# Patient Record
Sex: FEMALE | Race: BLACK OR AFRICAN AMERICAN | NOT HISPANIC OR LATINO
[De-identification: names, ages, dates, MRNs, and addresses within clinical notes are randomized per-mention and may not be internally consistent; named-entity substitution may affect disease eponyms.]

---

## 2017-01-10 ENCOUNTER — APPOINTMENT (OUTPATIENT)
Dept: CARDIOLOGY | Facility: CLINIC | Age: 37
End: 2017-01-10

## 2017-01-10 ENCOUNTER — NON-APPOINTMENT (OUTPATIENT)
Age: 37
End: 2017-01-10

## 2017-01-10 VITALS
OXYGEN SATURATION: 100 % | DIASTOLIC BLOOD PRESSURE: 70 MMHG | BODY MASS INDEX: 25.27 KG/M2 | HEART RATE: 78 BPM | HEIGHT: 67 IN | SYSTOLIC BLOOD PRESSURE: 110 MMHG | WEIGHT: 161 LBS

## 2017-02-08 ENCOUNTER — APPOINTMENT (OUTPATIENT)
Dept: RHEUMATOLOGY | Facility: CLINIC | Age: 37
End: 2017-02-08

## 2017-02-08 ENCOUNTER — APPOINTMENT (OUTPATIENT)
Dept: ENDOCRINOLOGY | Facility: CLINIC | Age: 37
End: 2017-02-08

## 2017-02-08 VITALS
HEIGHT: 67 IN | WEIGHT: 163 LBS | BODY MASS INDEX: 25.58 KG/M2 | SYSTOLIC BLOOD PRESSURE: 120 MMHG | DIASTOLIC BLOOD PRESSURE: 80 MMHG | OXYGEN SATURATION: 99 % | HEART RATE: 86 BPM

## 2017-02-08 VITALS
TEMPERATURE: 97.9 F | BODY MASS INDEX: 25.43 KG/M2 | SYSTOLIC BLOOD PRESSURE: 121 MMHG | HEIGHT: 67 IN | DIASTOLIC BLOOD PRESSURE: 79 MMHG | OXYGEN SATURATION: 98 % | WEIGHT: 162 LBS | HEART RATE: 83 BPM

## 2017-02-08 LAB
GLUCOSE BLDC GLUCOMTR-MCNC: 141
HBA1C MFR BLD HPLC: 6.2

## 2017-02-08 RX ORDER — INSULIN GLARGINE 100 [IU]/ML
100 INJECTION, SOLUTION SUBCUTANEOUS
Refills: 0 | Status: ACTIVE | COMMUNITY

## 2017-02-08 RX ORDER — LABETALOL HYDROCHLORIDE 200 MG/1
200 TABLET, FILM COATED ORAL DAILY
Qty: 90 | Refills: 2 | Status: DISCONTINUED | COMMUNITY
Start: 2017-01-10 | End: 2017-02-08

## 2017-02-09 LAB
25(OH)D3 SERPL-MCNC: 29.1 NG/ML
ALBUMIN SERPL ELPH-MCNC: 3.8 G/DL
ALP BLD-CCNC: 51 U/L
ALT SERPL-CCNC: 12 U/L
ANION GAP SERPL CALC-SCNC: 12 MMOL/L
AST SERPL-CCNC: 16 U/L
BASOPHILS # BLD AUTO: 0.02 K/UL
BASOPHILS NFR BLD AUTO: 0.5 %
BILIRUB SERPL-MCNC: 0.3 MG/DL
BUN SERPL-MCNC: 11 MG/DL
CALCIUM SERPL-MCNC: 9 MG/DL
CHLORIDE SERPL-SCNC: 103 MMOL/L
CO2 SERPL-SCNC: 25 MMOL/L
CREAT SERPL-MCNC: 0.53 MG/DL
CRP SERPL-MCNC: <0.2 MG/DL
EOSINOPHIL # BLD AUTO: 0.12 K/UL
EOSINOPHIL NFR BLD AUTO: 2.7 %
ERYTHROCYTE [SEDIMENTATION RATE] IN BLOOD BY WESTERGREN METHOD: 2 MM/HR
GLUCOSE SERPL-MCNC: 65 MG/DL
HCT VFR BLD CALC: 35.5 %
HGB BLD-MCNC: 11.6 G/DL
IMM GRANULOCYTES NFR BLD AUTO: 0 %
LYMPHOCYTES # BLD AUTO: 2.18 K/UL
LYMPHOCYTES NFR BLD AUTO: 49.3 %
MAN DIFF?: NORMAL
MCHC RBC-ENTMCNC: 29.7 PG
MCHC RBC-ENTMCNC: 32.7 GM/DL
MCV RBC AUTO: 91 FL
MONOCYTES # BLD AUTO: 0.49 K/UL
MONOCYTES NFR BLD AUTO: 11.1 %
NEUTROPHILS # BLD AUTO: 1.61 K/UL
NEUTROPHILS NFR BLD AUTO: 36.4 %
PLATELET # BLD AUTO: 236 K/UL
POTASSIUM SERPL-SCNC: 3.9 MMOL/L
PROT SERPL-MCNC: 6.5 G/DL
RBC # BLD: 3.9 M/UL
RBC # FLD: 13.9 %
SODIUM SERPL-SCNC: 140 MMOL/L
URATE SERPL-MCNC: 4.4 MG/DL
WBC # FLD AUTO: 4.42 K/UL

## 2017-02-13 ENCOUNTER — OTHER (OUTPATIENT)
Age: 37
End: 2017-02-13

## 2017-02-14 ENCOUNTER — APPOINTMENT (OUTPATIENT)
Dept: RHEUMATOLOGY | Facility: CLINIC | Age: 37
End: 2017-02-14

## 2017-02-14 VITALS
HEIGHT: 67 IN | BODY MASS INDEX: 25.43 KG/M2 | SYSTOLIC BLOOD PRESSURE: 122 MMHG | WEIGHT: 162 LBS | TEMPERATURE: 98.5 F | DIASTOLIC BLOOD PRESSURE: 81 MMHG | HEART RATE: 105 BPM | OXYGEN SATURATION: 99 %

## 2017-02-14 RX ORDER — LIDOCAINE HYDROCHLORIDE 10 MG/ML
1 INJECTION, SOLUTION INFILTRATION
Refills: 0 | Status: COMPLETED | OUTPATIENT
Start: 2017-02-14

## 2017-02-14 RX ADMIN — LIDOCAINE HYDROCHLORIDE 0 %: 10 INJECTION, SOLUTION EPIDURAL; INFILTRATION; INTRACAUDAL; PERINEURAL at 00:00

## 2017-02-27 ENCOUNTER — RX RENEWAL (OUTPATIENT)
Age: 37
End: 2017-02-27

## 2017-03-20 ENCOUNTER — RX RENEWAL (OUTPATIENT)
Age: 37
End: 2017-03-20

## 2017-03-30 ENCOUNTER — APPOINTMENT (OUTPATIENT)
Dept: RHEUMATOLOGY | Facility: CLINIC | Age: 37
End: 2017-03-30

## 2017-03-31 VITALS
TEMPERATURE: 98.1 F | HEART RATE: 96 BPM | SYSTOLIC BLOOD PRESSURE: 119 MMHG | RESPIRATION RATE: 18 BRPM | DIASTOLIC BLOOD PRESSURE: 79 MMHG

## 2017-05-22 ENCOUNTER — APPOINTMENT (OUTPATIENT)
Dept: RHEUMATOLOGY | Facility: CLINIC | Age: 37
End: 2017-05-22

## 2017-05-31 ENCOUNTER — APPOINTMENT (OUTPATIENT)
Dept: RHEUMATOLOGY | Facility: CLINIC | Age: 37
End: 2017-05-31

## 2017-05-31 VITALS
HEIGHT: 67 IN | BODY MASS INDEX: 24.01 KG/M2 | DIASTOLIC BLOOD PRESSURE: 80 MMHG | OXYGEN SATURATION: 98 % | WEIGHT: 153 LBS | HEART RATE: 89 BPM | TEMPERATURE: 98.9 F | SYSTOLIC BLOOD PRESSURE: 118 MMHG

## 2017-05-31 RX ORDER — ETANERCEPT 50 MG/ML
50 SOLUTION SUBCUTANEOUS
Qty: 3 | Refills: 3 | Status: DISCONTINUED | COMMUNITY
Start: 2017-03-31 | End: 2017-05-31

## 2017-06-01 ENCOUNTER — OTHER (OUTPATIENT)
Age: 37
End: 2017-06-01

## 2017-06-01 LAB
25(OH)D3 SERPL-MCNC: 26.9 NG/ML
ALBUMIN SERPL ELPH-MCNC: 4.3 G/DL
ALP BLD-CCNC: 57 U/L
ALT SERPL-CCNC: 11 U/L
ANION GAP SERPL CALC-SCNC: 15 MMOL/L
AST SERPL-CCNC: 19 U/L
BASOPHILS # BLD AUTO: 0.02 K/UL
BASOPHILS NFR BLD AUTO: 0.4 %
BILIRUB SERPL-MCNC: 0.3 MG/DL
BUN SERPL-MCNC: 11 MG/DL
CALCIUM SERPL-MCNC: 9 MG/DL
CHLORIDE SERPL-SCNC: 101 MMOL/L
CO2 SERPL-SCNC: 23 MMOL/L
CREAT SERPL-MCNC: 0.66 MG/DL
CRP SERPL-MCNC: <0.2 MG/DL
EOSINOPHIL # BLD AUTO: 0.06 K/UL
EOSINOPHIL NFR BLD AUTO: 1.2 %
ERYTHROCYTE [SEDIMENTATION RATE] IN BLOOD BY WESTERGREN METHOD: 2 MM/HR
GLUCOSE SERPL-MCNC: 135 MG/DL
HCT VFR BLD CALC: 34.4 %
HGB BLD-MCNC: 11.2 G/DL
IMM GRANULOCYTES NFR BLD AUTO: 0.2 %
LYMPHOCYTES # BLD AUTO: 1.89 K/UL
LYMPHOCYTES NFR BLD AUTO: 37.1 %
MAN DIFF?: NORMAL
MCHC RBC-ENTMCNC: 28.5 PG
MCHC RBC-ENTMCNC: 32.6 GM/DL
MCV RBC AUTO: 87.5 FL
MONOCYTES # BLD AUTO: 0.41 K/UL
MONOCYTES NFR BLD AUTO: 8.1 %
NEUTROPHILS # BLD AUTO: 2.7 K/UL
NEUTROPHILS NFR BLD AUTO: 53 %
PLATELET # BLD AUTO: 281 K/UL
POTASSIUM SERPL-SCNC: 4.1 MMOL/L
PROT SERPL-MCNC: 7.2 G/DL
RBC # BLD: 3.93 M/UL
RBC # FLD: 12.9 %
SODIUM SERPL-SCNC: 139 MMOL/L
WBC # FLD AUTO: 5.09 K/UL

## 2017-06-01 RX ORDER — PREDNISONE 10 MG/1
10 TABLET ORAL
Qty: 30 | Refills: 0 | Status: DISCONTINUED | COMMUNITY
Start: 2017-02-08 | End: 2017-06-01

## 2017-06-14 ENCOUNTER — APPOINTMENT (OUTPATIENT)
Dept: ENDOCRINOLOGY | Facility: CLINIC | Age: 37
End: 2017-06-14

## 2017-06-20 ENCOUNTER — OTHER (OUTPATIENT)
Age: 37
End: 2017-06-20

## 2017-07-12 ENCOUNTER — FORM ENCOUNTER (OUTPATIENT)
Age: 37
End: 2017-07-12

## 2017-07-13 ENCOUNTER — APPOINTMENT (OUTPATIENT)
Dept: RHEUMATOLOGY | Facility: CLINIC | Age: 37
End: 2017-07-13

## 2017-07-13 VITALS — SYSTOLIC BLOOD PRESSURE: 130 MMHG | OXYGEN SATURATION: 99 % | HEART RATE: 86 BPM | DIASTOLIC BLOOD PRESSURE: 86 MMHG

## 2017-07-13 DIAGNOSIS — I31.3 PERICARDIAL EFFUSION (NONINFLAMMATORY): ICD-10-CM

## 2017-07-13 DIAGNOSIS — I10 ESSENTIAL (PRIMARY) HYPERTENSION: ICD-10-CM

## 2017-07-14 ENCOUNTER — OTHER (OUTPATIENT)
Age: 37
End: 2017-07-14

## 2017-07-14 LAB
25(OH)D3 SERPL-MCNC: 18.3 NG/ML
ALBUMIN SERPL ELPH-MCNC: 4.1 G/DL
ALP BLD-CCNC: 56 U/L
ALT SERPL-CCNC: 13 U/L
ANION GAP SERPL CALC-SCNC: 19 MMOL/L
AST SERPL-CCNC: 11 U/L
BASOPHILS # BLD AUTO: 0.01 K/UL
BASOPHILS NFR BLD AUTO: 0.2 %
BILIRUB SERPL-MCNC: 0.2 MG/DL
BUN SERPL-MCNC: 7 MG/DL
CALCIUM SERPL-MCNC: 8.9 MG/DL
CHLORIDE SERPL-SCNC: 100 MMOL/L
CO2 SERPL-SCNC: 19 MMOL/L
CREAT SERPL-MCNC: 0.61 MG/DL
CRP SERPL-MCNC: <0.2 MG/DL
EOSINOPHIL # BLD AUTO: 0.02 K/UL
EOSINOPHIL NFR BLD AUTO: 0.4 %
ERYTHROCYTE [SEDIMENTATION RATE] IN BLOOD BY WESTERGREN METHOD: 2 MM/HR
GLUCOSE SERPL-MCNC: 264 MG/DL
HCT VFR BLD CALC: 34.2 %
HGB BLD-MCNC: 11.2 G/DL
IMM GRANULOCYTES NFR BLD AUTO: 0 %
LYMPHOCYTES # BLD AUTO: 1.29 K/UL
LYMPHOCYTES NFR BLD AUTO: 23.3 %
MAN DIFF?: NORMAL
MCHC RBC-ENTMCNC: 28.1 PG
MCHC RBC-ENTMCNC: 32.7 GM/DL
MCV RBC AUTO: 85.9 FL
MONOCYTES # BLD AUTO: 0.35 K/UL
MONOCYTES NFR BLD AUTO: 6.3 %
NEUTROPHILS # BLD AUTO: 3.86 K/UL
NEUTROPHILS NFR BLD AUTO: 69.8 %
PLATELET # BLD AUTO: 276 K/UL
POTASSIUM SERPL-SCNC: 4.3 MMOL/L
PROT SERPL-MCNC: 7 G/DL
RBC # BLD: 3.98 M/UL
RBC # FLD: 14.2 %
SODIUM SERPL-SCNC: 138 MMOL/L
TSH SERPL-ACNC: 1.17 UIU/ML
WBC # FLD AUTO: 5.53 K/UL

## 2017-07-25 ENCOUNTER — FORM ENCOUNTER (OUTPATIENT)
Age: 37
End: 2017-07-25

## 2017-07-26 ENCOUNTER — OUTPATIENT (OUTPATIENT)
Dept: OUTPATIENT SERVICES | Facility: HOSPITAL | Age: 37
LOS: 1 days | End: 2017-07-26
Payer: COMMERCIAL

## 2017-07-26 ENCOUNTER — APPOINTMENT (OUTPATIENT)
Dept: MRI IMAGING | Facility: CLINIC | Age: 37
End: 2017-07-26

## 2017-07-26 DIAGNOSIS — M25.462 EFFUSION, LEFT KNEE: ICD-10-CM

## 2017-07-26 DIAGNOSIS — E78.89 OTHER LIPOPROTEIN METABOLISM DISORDERS: ICD-10-CM

## 2017-07-26 DIAGNOSIS — M25.461 EFFUSION, RIGHT KNEE: ICD-10-CM

## 2017-07-26 PROCEDURE — 73721 MRI JNT OF LWR EXTRE W/O DYE: CPT

## 2017-07-27 ENCOUNTER — RX RENEWAL (OUTPATIENT)
Age: 37
End: 2017-07-27

## 2017-08-02 ENCOUNTER — OTHER (OUTPATIENT)
Age: 37
End: 2017-08-02

## 2017-08-02 RX ORDER — PREDNISONE 5 MG/1
5 TABLET ORAL
Qty: 90 | Refills: 0 | Status: DISCONTINUED | COMMUNITY
Start: 2017-05-31 | End: 2017-08-02

## 2017-08-15 ENCOUNTER — LABORATORY RESULT (OUTPATIENT)
Age: 37
End: 2017-08-15

## 2017-08-15 ENCOUNTER — APPOINTMENT (OUTPATIENT)
Dept: ORTHOPEDIC SURGERY | Facility: CLINIC | Age: 37
End: 2017-08-15
Payer: COMMERCIAL

## 2017-08-15 VITALS
DIASTOLIC BLOOD PRESSURE: 92 MMHG | HEIGHT: 67 IN | SYSTOLIC BLOOD PRESSURE: 136 MMHG | BODY MASS INDEX: 24.01 KG/M2 | WEIGHT: 153 LBS | HEART RATE: 98 BPM

## 2017-08-15 DIAGNOSIS — Z78.9 OTHER SPECIFIED HEALTH STATUS: ICD-10-CM

## 2017-08-15 DIAGNOSIS — K65.9 PERITONITIS, UNSPECIFIED: ICD-10-CM

## 2017-08-15 DIAGNOSIS — M12.261 VILLONODULAR SYNOVITIS (PIGMENTED), RIGHT KNEE: ICD-10-CM

## 2017-08-15 PROCEDURE — 20610 DRAIN/INJ JOINT/BURSA W/O US: CPT | Mod: RT

## 2017-08-15 PROCEDURE — 99203 OFFICE O/P NEW LOW 30 MIN: CPT | Mod: 25

## 2017-08-16 LAB — CRYSTALS SNV QL MICRO: NORMAL

## 2017-08-21 LAB
BACTERIA FLD CULT: NORMAL
BACTERIA FLD CULT: NORMAL

## 2017-09-07 ENCOUNTER — RX RENEWAL (OUTPATIENT)
Age: 37
End: 2017-09-07

## 2017-10-16 ENCOUNTER — RX RENEWAL (OUTPATIENT)
Age: 37
End: 2017-10-16

## 2017-10-25 ENCOUNTER — RX RENEWAL (OUTPATIENT)
Age: 37
End: 2017-10-25

## 2017-10-26 ENCOUNTER — RX RENEWAL (OUTPATIENT)
Age: 37
End: 2017-10-26

## 2017-11-21 ENCOUNTER — RX RENEWAL (OUTPATIENT)
Age: 37
End: 2017-11-21

## 2017-11-29 ENCOUNTER — APPOINTMENT (OUTPATIENT)
Dept: ENDOCRINOLOGY | Facility: CLINIC | Age: 37
End: 2017-11-29

## 2017-12-06 ENCOUNTER — RX RENEWAL (OUTPATIENT)
Age: 37
End: 2017-12-06

## 2017-12-06 ENCOUNTER — TRANSCRIPTION ENCOUNTER (OUTPATIENT)
Age: 37
End: 2017-12-06

## 2018-01-02 ENCOUNTER — RX RENEWAL (OUTPATIENT)
Age: 38
End: 2018-01-02

## 2018-01-17 ENCOUNTER — APPOINTMENT (OUTPATIENT)
Dept: ENDOCRINOLOGY | Facility: CLINIC | Age: 38
End: 2018-01-17
Payer: COMMERCIAL

## 2018-01-17 PROCEDURE — 83036 HEMOGLOBIN GLYCOSYLATED A1C: CPT | Mod: QW

## 2018-01-17 PROCEDURE — 82962 GLUCOSE BLOOD TEST: CPT

## 2018-01-17 PROCEDURE — G0108 DIAB MANAGE TRN  PER INDIV: CPT

## 2018-01-18 LAB
GLUCOSE BLDC GLUCOMTR-MCNC: 315
HBA1C MFR BLD HPLC: 12.2

## 2018-01-18 RX ORDER — FLASH GLUCOSE SENSOR
KIT MISCELLANEOUS
Qty: 1 | Refills: 0 | Status: ACTIVE | COMMUNITY
Start: 2018-01-17

## 2018-01-31 ENCOUNTER — APPOINTMENT (OUTPATIENT)
Dept: ENDOCRINOLOGY | Facility: CLINIC | Age: 38
End: 2018-01-31
Payer: COMMERCIAL

## 2018-01-31 PROCEDURE — G0108 DIAB MANAGE TRN  PER INDIV: CPT

## 2018-02-03 ENCOUNTER — RX RENEWAL (OUTPATIENT)
Age: 38
End: 2018-02-03

## 2018-02-07 ENCOUNTER — RX RENEWAL (OUTPATIENT)
Age: 38
End: 2018-02-07

## 2018-02-12 ENCOUNTER — TRANSCRIPTION ENCOUNTER (OUTPATIENT)
Age: 38
End: 2018-02-12

## 2018-02-26 ENCOUNTER — RX RENEWAL (OUTPATIENT)
Age: 38
End: 2018-02-26

## 2018-03-23 ENCOUNTER — APPOINTMENT (OUTPATIENT)
Dept: RHEUMATOLOGY | Facility: CLINIC | Age: 38
End: 2018-03-23
Payer: COMMERCIAL

## 2018-03-23 ENCOUNTER — OTHER (OUTPATIENT)
Age: 38
End: 2018-03-23

## 2018-03-23 VITALS
SYSTOLIC BLOOD PRESSURE: 115 MMHG | HEART RATE: 80 BPM | HEIGHT: 67 IN | BODY MASS INDEX: 24.8 KG/M2 | TEMPERATURE: 98.5 F | DIASTOLIC BLOOD PRESSURE: 81 MMHG | WEIGHT: 158 LBS | OXYGEN SATURATION: 98 %

## 2018-03-23 DIAGNOSIS — M25.569 PAIN IN UNSPECIFIED KNEE: ICD-10-CM

## 2018-03-23 PROCEDURE — 99213 OFFICE O/P EST LOW 20 MIN: CPT

## 2018-03-26 ENCOUNTER — TRANSCRIPTION ENCOUNTER (OUTPATIENT)
Age: 38
End: 2018-03-26

## 2018-03-26 LAB
ALBUMIN SERPL ELPH-MCNC: 4.1 G/DL
ALP BLD-CCNC: 58 U/L
ALT SERPL-CCNC: 8 U/L
ANION GAP SERPL CALC-SCNC: 14 MMOL/L
AST SERPL-CCNC: 16 U/L
BASOPHILS # BLD AUTO: 0.02 K/UL
BASOPHILS NFR BLD AUTO: 0.4 %
BILIRUB SERPL-MCNC: 0.3 MG/DL
BUN SERPL-MCNC: 9 MG/DL
CALCIUM SERPL-MCNC: 9.1 MG/DL
CHLORIDE SERPL-SCNC: 102 MMOL/L
CO2 SERPL-SCNC: 23 MMOL/L
CREAT SERPL-MCNC: 0.6 MG/DL
CRP SERPL-MCNC: <0.2 MG/DL
EOSINOPHIL # BLD AUTO: 0.09 K/UL
EOSINOPHIL NFR BLD AUTO: 2 %
GLUCOSE SERPL-MCNC: 124 MG/DL
HCT VFR BLD CALC: 32.7 %
HGB BLD-MCNC: 10.5 G/DL
IMM GRANULOCYTES NFR BLD AUTO: 0 %
LYMPHOCYTES # BLD AUTO: 2.05 K/UL
LYMPHOCYTES NFR BLD AUTO: 46.1 %
MAN DIFF?: NORMAL
MCHC RBC-ENTMCNC: 26.4 PG
MCHC RBC-ENTMCNC: 32.1 GM/DL
MCV RBC AUTO: 82.2 FL
MONOCYTES # BLD AUTO: 0.41 K/UL
MONOCYTES NFR BLD AUTO: 9.2 %
NEUTROPHILS # BLD AUTO: 1.88 K/UL
NEUTROPHILS NFR BLD AUTO: 42.3 %
PLATELET # BLD AUTO: 244 K/UL
POTASSIUM SERPL-SCNC: 4 MMOL/L
PROT SERPL-MCNC: 7.2 G/DL
RBC # BLD: 3.98 M/UL
RBC # FLD: 15.5 %
SODIUM SERPL-SCNC: 139 MMOL/L
WBC # FLD AUTO: 4.45 K/UL

## 2018-03-29 ENCOUNTER — TRANSCRIPTION ENCOUNTER (OUTPATIENT)
Age: 38
End: 2018-03-29

## 2018-04-18 ENCOUNTER — APPOINTMENT (OUTPATIENT)
Dept: ENDOCRINOLOGY | Facility: CLINIC | Age: 38
End: 2018-04-18
Payer: COMMERCIAL

## 2018-04-18 VITALS
WEIGHT: 159 LBS | OXYGEN SATURATION: 99 % | DIASTOLIC BLOOD PRESSURE: 82 MMHG | HEART RATE: 100 BPM | SYSTOLIC BLOOD PRESSURE: 116 MMHG | BODY MASS INDEX: 24.96 KG/M2 | HEIGHT: 67 IN

## 2018-04-18 LAB
GLUCOSE BLDC GLUCOMTR-MCNC: 91
HBA1C MFR BLD HPLC: 6.7

## 2018-04-18 PROCEDURE — 83036 HEMOGLOBIN GLYCOSYLATED A1C: CPT | Mod: QW

## 2018-04-18 PROCEDURE — 82962 GLUCOSE BLOOD TEST: CPT

## 2018-04-18 PROCEDURE — 99215 OFFICE O/P EST HI 40 MIN: CPT | Mod: 25

## 2018-05-23 ENCOUNTER — APPOINTMENT (OUTPATIENT)
Dept: RHEUMATOLOGY | Facility: CLINIC | Age: 38
End: 2018-05-23
Payer: COMMERCIAL

## 2018-05-23 VITALS
HEIGHT: 67 IN | TEMPERATURE: 99 F | BODY MASS INDEX: 24.8 KG/M2 | WEIGHT: 158 LBS | SYSTOLIC BLOOD PRESSURE: 114 MMHG | HEART RATE: 85 BPM | OXYGEN SATURATION: 99 % | DIASTOLIC BLOOD PRESSURE: 75 MMHG

## 2018-05-23 DIAGNOSIS — D64.9 ANEMIA, UNSPECIFIED: ICD-10-CM

## 2018-05-23 LAB
ALBUMIN SERPL ELPH-MCNC: 4.3 G/DL
ALP BLD-CCNC: 68 U/L
ALT SERPL-CCNC: 13 U/L
ANION GAP SERPL CALC-SCNC: 12 MMOL/L
AST SERPL-CCNC: 16 U/L
BASOPHILS # BLD AUTO: 0.01 K/UL
BASOPHILS NFR BLD AUTO: 0.2 %
BILIRUB SERPL-MCNC: 0.3 MG/DL
BUN SERPL-MCNC: 11 MG/DL
CALCIUM SERPL-MCNC: 9.2 MG/DL
CHLORIDE SERPL-SCNC: 105 MMOL/L
CO2 SERPL-SCNC: 23 MMOL/L
CREAT SERPL-MCNC: 0.62 MG/DL
CRP SERPL-MCNC: 0.2 MG/DL
EOSINOPHIL # BLD AUTO: 0.06 K/UL
EOSINOPHIL NFR BLD AUTO: 1.4 %
GLUCOSE SERPL-MCNC: 99 MG/DL
HCT VFR BLD CALC: 33.2 %
HGB BLD-MCNC: 10.8 G/DL
IMM GRANULOCYTES NFR BLD AUTO: 0 %
IRON SATN MFR SERPL: 9 %
IRON SERPL-MCNC: 32 UG/DL
LYMPHOCYTES # BLD AUTO: 1.6 K/UL
LYMPHOCYTES NFR BLD AUTO: 38.6 %
MAN DIFF?: NORMAL
MCHC RBC-ENTMCNC: 26.9 PG
MCHC RBC-ENTMCNC: 32.5 GM/DL
MCV RBC AUTO: 82.8 FL
MONOCYTES # BLD AUTO: 0.47 K/UL
MONOCYTES NFR BLD AUTO: 11.4 %
NEUTROPHILS # BLD AUTO: 2 K/UL
NEUTROPHILS NFR BLD AUTO: 48.4 %
PLATELET # BLD AUTO: 253 K/UL
POTASSIUM SERPL-SCNC: 4.1 MMOL/L
PROT SERPL-MCNC: 7.2 G/DL
RBC # BLD: 4.01 M/UL
RBC # FLD: 16 %
SODIUM SERPL-SCNC: 140 MMOL/L
TIBC SERPL-MCNC: 342 UG/DL
UIBC SERPL-MCNC: 310 UG/DL
WBC # FLD AUTO: 4.14 K/UL

## 2018-05-23 PROCEDURE — 99214 OFFICE O/P EST MOD 30 MIN: CPT

## 2018-05-25 LAB
25(OH)D3 SERPL-MCNC: 35.2 NG/ML
FERRITIN SERPL-MCNC: 17 NG/ML
TSH SERPL-ACNC: 2.15 UIU/ML

## 2018-06-13 ENCOUNTER — APPOINTMENT (OUTPATIENT)
Dept: ENDOCRINOLOGY | Facility: CLINIC | Age: 38
End: 2018-06-13

## 2018-06-25 ENCOUNTER — RX RENEWAL (OUTPATIENT)
Age: 38
End: 2018-06-25

## 2018-06-26 ENCOUNTER — RX RENEWAL (OUTPATIENT)
Age: 38
End: 2018-06-26

## 2018-08-16 ENCOUNTER — APPOINTMENT (OUTPATIENT)
Dept: RHEUMATOLOGY | Facility: CLINIC | Age: 38
End: 2018-08-16
Payer: COMMERCIAL

## 2018-08-16 VITALS
HEART RATE: 81 BPM | TEMPERATURE: 98.7 F | DIASTOLIC BLOOD PRESSURE: 86 MMHG | OXYGEN SATURATION: 95 % | WEIGHT: 157 LBS | SYSTOLIC BLOOD PRESSURE: 132 MMHG | BODY MASS INDEX: 24.64 KG/M2 | HEIGHT: 67 IN

## 2018-08-16 DIAGNOSIS — M25.462 EFFUSION, LEFT KNEE: ICD-10-CM

## 2018-08-16 DIAGNOSIS — E78.89 OTHER LIPOPROTEIN METABOLISM DISORDERS: ICD-10-CM

## 2018-08-16 PROCEDURE — 99213 OFFICE O/P EST LOW 20 MIN: CPT

## 2018-09-27 ENCOUNTER — RX RENEWAL (OUTPATIENT)
Age: 38
End: 2018-09-27

## 2018-09-27 RX ORDER — FOLIC ACID 1 MG/1
1 TABLET ORAL DAILY
Qty: 100 | Refills: 0 | Status: ACTIVE | COMMUNITY
Start: 2017-05-31 | End: 1900-01-01

## 2018-10-11 ENCOUNTER — APPOINTMENT (OUTPATIENT)
Dept: RHEUMATOLOGY | Facility: CLINIC | Age: 38
End: 2018-10-11
Payer: COMMERCIAL

## 2018-10-11 ENCOUNTER — OTHER (OUTPATIENT)
Age: 38
End: 2018-10-11

## 2018-10-11 VITALS
SYSTOLIC BLOOD PRESSURE: 113 MMHG | TEMPERATURE: 99 F | WEIGHT: 156 LBS | HEART RATE: 85 BPM | OXYGEN SATURATION: 99 % | DIASTOLIC BLOOD PRESSURE: 76 MMHG | BODY MASS INDEX: 24.48 KG/M2 | HEIGHT: 67 IN

## 2018-10-11 DIAGNOSIS — M25.569 PAIN IN UNSPECIFIED KNEE: ICD-10-CM

## 2018-10-11 DIAGNOSIS — M25.461 EFFUSION, RIGHT KNEE: ICD-10-CM

## 2018-10-11 DIAGNOSIS — M25.462 EFFUSION, RIGHT KNEE: ICD-10-CM

## 2018-10-11 PROCEDURE — 99213 OFFICE O/P EST LOW 20 MIN: CPT

## 2018-10-11 RX ORDER — METHOTREXATE 2.5 MG/1
2.5 TABLET ORAL
Qty: 34 | Refills: 4 | Status: ACTIVE | COMMUNITY
Start: 2017-05-31 | End: 1900-01-01

## 2018-10-12 LAB
ALBUMIN SERPL ELPH-MCNC: 4.2 G/DL
ALP BLD-CCNC: 65 U/L
ALT SERPL-CCNC: 11 U/L
ANION GAP SERPL CALC-SCNC: 9 MMOL/L
AST SERPL-CCNC: 14 U/L
BASOPHILS # BLD AUTO: 0.02 K/UL
BASOPHILS NFR BLD AUTO: 0.4 %
BILIRUB SERPL-MCNC: 0.2 MG/DL
BUN SERPL-MCNC: 9 MG/DL
CALCIUM SERPL-MCNC: 8.7 MG/DL
CHLORIDE SERPL-SCNC: 103 MMOL/L
CO2 SERPL-SCNC: 25 MMOL/L
CREAT SERPL-MCNC: 0.59 MG/DL
CRP SERPL-MCNC: 0.4 MG/DL
EOSINOPHIL # BLD AUTO: 0.07 K/UL
EOSINOPHIL NFR BLD AUTO: 1.3 %
ERYTHROCYTE [SEDIMENTATION RATE] IN BLOOD BY WESTERGREN METHOD: 5 MM/HR
GLUCOSE SERPL-MCNC: 146 MG/DL
HCT VFR BLD CALC: 33.8 %
HGB BLD-MCNC: 11.3 G/DL
IMM GRANULOCYTES NFR BLD AUTO: 0 %
LYMPHOCYTES # BLD AUTO: 1.64 K/UL
LYMPHOCYTES NFR BLD AUTO: 31 %
MAN DIFF?: NORMAL
MCHC RBC-ENTMCNC: 28.5 PG
MCHC RBC-ENTMCNC: 33.4 GM/DL
MCV RBC AUTO: 85.4 FL
MONOCYTES # BLD AUTO: 0.63 K/UL
MONOCYTES NFR BLD AUTO: 11.9 %
NEUTROPHILS # BLD AUTO: 2.93 K/UL
NEUTROPHILS NFR BLD AUTO: 55.4 %
PLATELET # BLD AUTO: 270 K/UL
POTASSIUM SERPL-SCNC: 4 MMOL/L
PROT SERPL-MCNC: 6.7 G/DL
RBC # BLD: 3.96 M/UL
RBC # FLD: 15.8 %
SODIUM SERPL-SCNC: 137 MMOL/L
WBC # FLD AUTO: 5.29 K/UL

## 2018-12-03 ENCOUNTER — APPOINTMENT (OUTPATIENT)
Dept: RHEUMATOLOGY | Facility: CLINIC | Age: 38
End: 2018-12-03

## 2019-01-02 ENCOUNTER — APPOINTMENT (OUTPATIENT)
Dept: ENDOCRINOLOGY | Facility: CLINIC | Age: 39
End: 2019-01-02
Payer: COMMERCIAL

## 2019-01-02 VITALS
OXYGEN SATURATION: 98 % | HEART RATE: 74 BPM | HEIGHT: 67 IN | SYSTOLIC BLOOD PRESSURE: 118 MMHG | BODY MASS INDEX: 24.8 KG/M2 | WEIGHT: 158 LBS | DIASTOLIC BLOOD PRESSURE: 74 MMHG

## 2019-01-02 DIAGNOSIS — O24.919 UNSPECIFIED DIABETES MELLITUS IN PREGNANCY, UNSPECIFIED TRIMESTER: ICD-10-CM

## 2019-01-02 LAB
GLUCOSE BLDC GLUCOMTR-MCNC: 163
HBA1C MFR BLD HPLC: 6.7
T4 FREE SERPL-MCNC: 1.2 NG/DL
TSH SERPL-ACNC: 2.8 UIU/ML
VIT B12 SERPL-MCNC: 194 PG/ML

## 2019-01-02 PROCEDURE — 82962 GLUCOSE BLOOD TEST: CPT

## 2019-01-02 PROCEDURE — 99214 OFFICE O/P EST MOD 30 MIN: CPT | Mod: 25

## 2019-01-02 PROCEDURE — 95251 CONT GLUC MNTR ANALYSIS I&R: CPT

## 2019-01-02 PROCEDURE — 83036 HEMOGLOBIN GLYCOSYLATED A1C: CPT | Mod: QW

## 2019-01-02 RX ORDER — CHOLECALCIFEROL (VITAMIN D3) 1250 MCG
1.25 MG CAPSULE ORAL
Qty: 4 | Refills: 3 | Status: DISCONTINUED | COMMUNITY
Start: 2017-07-14 | End: 2019-01-02

## 2019-01-08 LAB
25(OH)D3 SERPL-MCNC: 33 NG/ML
ALBUMIN SERPL ELPH-MCNC: 4.1 G/DL
ALP BLD-CCNC: 59 U/L
ALT SERPL-CCNC: 11 U/L
ANION GAP SERPL CALC-SCNC: 9 MMOL/L
AST SERPL-CCNC: 16 U/L
BILIRUB SERPL-MCNC: 0.4 MG/DL
BUN SERPL-MCNC: 10 MG/DL
CALCIUM SERPL-MCNC: 9.1 MG/DL
CHLORIDE SERPL-SCNC: 104 MMOL/L
CHOLEST SERPL-MCNC: 135 MG/DL
CHOLEST/HDLC SERPL: 2.8 RATIO
CO2 SERPL-SCNC: 26 MMOL/L
CREAT SERPL-MCNC: 0.46 MG/DL
CREAT SPEC-SCNC: 182 MG/DL
GLUCOSE SERPL-MCNC: 154 MG/DL
HDLC SERPL-MCNC: 48 MG/DL
LDLC SERPL CALC-MCNC: 81 MG/DL
MICROALBUMIN 24H UR DL<=1MG/L-MCNC: <1.2 MG/DL
MICROALBUMIN/CREAT 24H UR-RTO: NORMAL
POTASSIUM SERPL-SCNC: 3.7 MMOL/L
PROT SERPL-MCNC: 7 G/DL
SODIUM SERPL-SCNC: 139 MMOL/L
TRIGL SERPL-MCNC: 32 MG/DL
TTG IGA SER IA-ACNC: <5 UNITS
TTG IGA SER-ACNC: NEGATIVE
TTG IGG SER IA-ACNC: 17.9 UNITS
TTG IGG SER IA-ACNC: NEGATIVE

## 2019-03-12 ENCOUNTER — TRANSCRIPTION ENCOUNTER (OUTPATIENT)
Age: 39
End: 2019-03-12

## 2019-03-13 ENCOUNTER — APPOINTMENT (OUTPATIENT)
Dept: OPHTHALMOLOGY | Facility: CLINIC | Age: 39
End: 2019-03-13
Payer: COMMERCIAL

## 2019-03-13 DIAGNOSIS — H40.003 PREGLAUCOMA, UNSPECIFIED, BILATERAL: ICD-10-CM

## 2019-03-13 PROCEDURE — 92004 COMPRE OPH EXAM NEW PT 1/>: CPT

## 2019-03-13 PROCEDURE — 92133 CPTRZD OPH DX IMG PST SGM ON: CPT

## 2019-03-26 ENCOUNTER — MEDICATION RENEWAL (OUTPATIENT)
Age: 39
End: 2019-03-26

## 2019-04-17 ENCOUNTER — APPOINTMENT (OUTPATIENT)
Dept: ENDOCRINOLOGY | Facility: CLINIC | Age: 39
End: 2019-04-17
Payer: COMMERCIAL

## 2019-04-17 VITALS
WEIGHT: 155 LBS | HEART RATE: 75 BPM | DIASTOLIC BLOOD PRESSURE: 82 MMHG | OXYGEN SATURATION: 98 % | HEIGHT: 67 IN | SYSTOLIC BLOOD PRESSURE: 122 MMHG | BODY MASS INDEX: 24.33 KG/M2

## 2019-04-17 LAB
GLUCOSE BLDC GLUCOMTR-MCNC: 184
HBA1C MFR BLD HPLC: 6.9

## 2019-04-17 PROCEDURE — 83036 HEMOGLOBIN GLYCOSYLATED A1C: CPT | Mod: GC,QW

## 2019-04-17 PROCEDURE — 82962 GLUCOSE BLOOD TEST: CPT | Mod: GC

## 2019-04-17 PROCEDURE — 99214 OFFICE O/P EST MOD 30 MIN: CPT | Mod: 25,GC

## 2019-04-18 NOTE — HISTORY OF PRESENT ILLNESS
[FreeTextEntry1] : Ms Grove is a 40 yo with history of DM1 diagnosed in August 2014, multinodular reticulocytosis with serositis/pericarditis presenting for DM1 follow up.  She reports that she has had episodes of hypoglycemia that her Dexcom arturo has been alerting her to.  She still has episodes of hypoglycemia during or after her workouts, especially when she does not eat a snack before.  She also notes hypoglycemia at the middle of the night.  She says she has been pushing back her dinners because of lots of housework in the evening and is only able to have a small dinner.  She did follow up with opthalmology and had no diabetic complications but did have elevated eye pressures.  She has been meaning to follow up with rheum, and she reports taking no medications for her condition.  She reports no changes in her insulin regimen:\par  humalog 12/12/12, lantus 18 units\par She is unable to provide clarity code for dexcom download today.

## 2019-04-18 NOTE — ASSESSMENT
[FreeTextEntry1] : 38 yo with history of DM1 diagnosed in August 2014 (Jan A1c 6.7%), multinodular reticulocytosis with serositis/pericarditis presenting for DM1 follow up\par \par DM1\par HgB A1c today 6.9%, about same as 6.7% in January. Overall good control.\par Will assess Dexcom data once able to obtain code\par Counseled patient to reduce breakfast humalog dose by one unit on work out days to prevent her post work out hypoglycemia.  Also reinforced need to eat snacks beforehand (sip gatorade during workout).\par Continue with humalog 12/12/12, lantus 18 with reduced breakfast dose on work out days\par Microalb neg Jan 2019\par B12 was 194 last visit - recommended supplementation. Recheck B12 level with next labs.\par LDL 81.\par \par Andi Romero PGY 1

## 2019-04-18 NOTE — PHYSICAL EXAM
[Alert] : alert [No Acute Distress] : no acute distress [Normal Sclera/Conjunctiva] : normal sclera/conjunctiva [Normal Outer Ear/Nose] : the ears and nose were normal in appearance [Supple] : the neck was supple [Clear to Auscultation] : lungs were clear to auscultation bilaterally [Normal Rate] : heart rate was normal  [No Edema] : there was no peripheral edema [Normal S1, S2] : normal S1 and S2 [Not Tender] : non-tender [Soft] : abdomen soft [No Stigmata of Cushings Syndrome] : no stigmata of cushings syndrome [Cranial Nerves Intact] : cranial nerves 2-12 were intact [No Rash] : no rash [Oriented x3] : oriented to person, place, and time [Normal Insight/Judgement] : insight and judgment were intact [de-identified] : Glucose monitoring device in place left of umbilicus

## 2019-05-28 ENCOUNTER — APPOINTMENT (OUTPATIENT)
Dept: OPHTHALMOLOGY | Facility: CLINIC | Age: 39
End: 2019-05-28

## 2019-06-03 ENCOUNTER — APPOINTMENT (OUTPATIENT)
Dept: OPHTHALMOLOGY | Facility: CLINIC | Age: 39
End: 2019-06-03

## 2019-07-17 ENCOUNTER — APPOINTMENT (OUTPATIENT)
Dept: ENDOCRINOLOGY | Facility: CLINIC | Age: 39
End: 2019-07-17
Payer: COMMERCIAL

## 2019-07-17 ENCOUNTER — RESULT CHARGE (OUTPATIENT)
Age: 39
End: 2019-07-17

## 2019-07-17 VITALS
BODY MASS INDEX: 24.64 KG/M2 | SYSTOLIC BLOOD PRESSURE: 110 MMHG | OXYGEN SATURATION: 99 % | HEART RATE: 80 BPM | HEIGHT: 67 IN | DIASTOLIC BLOOD PRESSURE: 70 MMHG | WEIGHT: 157 LBS

## 2019-07-17 LAB
GLUCOSE BLDC GLUCOMTR-MCNC: 192
HBA1C MFR BLD HPLC: 7.2

## 2019-07-17 PROCEDURE — 99214 OFFICE O/P EST MOD 30 MIN: CPT | Mod: 25

## 2019-07-17 PROCEDURE — 83036 HEMOGLOBIN GLYCOSYLATED A1C: CPT | Mod: QW

## 2019-07-17 PROCEDURE — 82962 GLUCOSE BLOOD TEST: CPT

## 2019-07-17 RX ORDER — PREDNISONE 10 MG/1
10 TABLET ORAL
Qty: 60 | Refills: 0 | Status: DISCONTINUED | COMMUNITY
Start: 2017-08-02 | End: 2019-07-17

## 2019-08-19 ENCOUNTER — APPOINTMENT (OUTPATIENT)
Dept: RHEUMATOLOGY | Facility: CLINIC | Age: 39
End: 2019-08-19

## 2019-11-06 ENCOUNTER — APPOINTMENT (OUTPATIENT)
Dept: ENDOCRINOLOGY | Facility: CLINIC | Age: 39
End: 2019-11-06
Payer: COMMERCIAL

## 2019-11-06 VITALS
BODY MASS INDEX: 24.33 KG/M2 | DIASTOLIC BLOOD PRESSURE: 80 MMHG | SYSTOLIC BLOOD PRESSURE: 120 MMHG | WEIGHT: 155 LBS | HEART RATE: 86 BPM | HEIGHT: 67 IN | OXYGEN SATURATION: 98 %

## 2019-11-06 DIAGNOSIS — E13.29 OTHER SPECIFIED DIABETES MELLITUS WITH OTHER DIABETIC KIDNEY COMPLICATION: ICD-10-CM

## 2019-11-06 DIAGNOSIS — R80.9 OTHER SPECIFIED DIABETES MELLITUS WITH OTHER DIABETIC KIDNEY COMPLICATION: ICD-10-CM

## 2019-11-06 DIAGNOSIS — Z79.4 OTHER SPECIFIED DIABETES MELLITUS WITH OTHER DIABETIC KIDNEY COMPLICATION: ICD-10-CM

## 2019-11-06 LAB
GLUCOSE BLDC GLUCOMTR-MCNC: 126
HBA1C MFR BLD HPLC: 7.8

## 2019-11-06 PROCEDURE — 99214 OFFICE O/P EST MOD 30 MIN: CPT | Mod: 25,GC

## 2019-11-06 PROCEDURE — 83036 HEMOGLOBIN GLYCOSYLATED A1C: CPT | Mod: GC,QW

## 2019-11-06 PROCEDURE — 82962 GLUCOSE BLOOD TEST: CPT | Mod: GC

## 2019-11-07 NOTE — HISTORY OF PRESENT ILLNESS
[FreeTextEntry1] : 39 F with history of DM1 diagnosed in August 2014, multinodular reticulocytosis with serositis/pericarditis presenting for DM1 follow up. Not on prednisone since 2016. She reports taking no medications for her rheumatological condition currently. \par \par HbA1c 7.2% July 2019, today it is 7.8%. Patient states she is drinking Eggnog everyday possibly causing her Hba1c to worsen, she will cut down on eggnog. \par \par DM1 regimen: Humalog 12/12/12 before meals, Lantus 18 units sq qhs. Declined pump in past. \par She is not currently using Dexcom because she needs a new transmitter but says its expensive and she will be speaking to isnurance company again and try to get it. So currently using glucometer and alternates with free style julissa. \par Checks FS 4 times a day and usually ranges from 120s-180s with occasional high in 200s in afternoons when she snacks : chips or crackers and cheese. No hypoglycemia or hyperglycemia >250. Goes to gym 3 times a week. Previously she was getting hypoglycemia with work out but now she snacks before work out and hydrates adequately during work out ; so not having hypoglycemic episodes. \par \par Last opthalmology visit: April 2019 :  did have elevated eye pressures,  no diabetic complications, next visit on 12/23/19 for glaucoma testing. No neuropathy reported. No known nephropathy. \par \par B12 deficiency was noted - she continues on supplement.

## 2019-11-07 NOTE — ASSESSMENT
[Carbohydrate Consistent Diet] : carbohydrate consistent diet [Hypoglycemia Management] : hypoglycemia management [Long Term Vascular Complications] : long term vascular complications of diabetes [Importance of Diet and Exercise] : importance of diet and exercise to improve glycemic control, achieve weight loss and improve cardiovascular health [Self Monitoring of Blood Glucose] : self monitoring of blood glucose [Retinopathy Screening] : Patient was referred to ophthalmology for retinopathy screening [FreeTextEntry1] : 40 yo with history of DM1 diagnosed in August 2014 (A1c 7.8% today), multinodular reticulocytosis with serositis/pericarditis presenting for DM1 follow up\par \par 1) DM1\par Fair DM control; HbA1c noted to be higher 7.8%, counseled about dietary changes and encouraged to continued with exercise routine. \par Off Dexcom due to unpaid bill can't obtain transmitter. Resume when able. Continue to use glucometer or free style julissa to monitor FS 3-4 times daily. \par Continue with Humalog 12/12/12 sq pre meals and Lantus 18 sq qhs.\par Up to date with opthalmology follow up\par Normal foot exam in clinic today\par Microalb neg Jan 2019, repeat at next visit \par LDL 81 Jan 2019, repeat at next visit \par \par 2) B12 deficiency - B12 was 194 last visit - continue supplementation. Recheck B12 level with next labs.\par \par Follow up visit in 3 months. \par \par Plan discussed with Dr. Richards. \par

## 2019-11-07 NOTE — REVIEW OF SYSTEMS
[All other systems negative] : All other systems negative [Negative] : Heme/Lymph [Recent Weight Gain (___ Lbs)] : no recent weight gain [Fatigue] : no fatigue [Recent Weight Loss (___ Lbs)] : no recent weight loss [Blurry Vision] : no blurred vision [Palpitations] : no palpitations [Chest Pain] : no chest pain [Lower Ext Edema] : no lower extremity edema [Shortness Of Breath] : no shortness of breath [Cough] : no cough [Vomiting] : no vomiting was observed [Nausea] : no nausea [Constipation] : no constipation [Diarrhea] : no diarrhea [Polyuria] : no polyuria [Abdominal Pain] : no abdominal pain [Irregular Menses] : regular menses [Pain/Numbness of Digits] : no pain/numbness of digits [Polydipsia] : no polydipsia

## 2019-12-23 ENCOUNTER — APPOINTMENT (OUTPATIENT)
Dept: OPHTHALMOLOGY | Facility: CLINIC | Age: 39
End: 2019-12-23
Payer: COMMERCIAL

## 2019-12-23 ENCOUNTER — NON-APPOINTMENT (OUTPATIENT)
Age: 39
End: 2019-12-23

## 2019-12-23 PROCEDURE — 92133 CPTRZD OPH DX IMG PST SGM ON: CPT

## 2019-12-23 PROCEDURE — 92012 INTRM OPH EXAM EST PATIENT: CPT

## 2019-12-23 PROCEDURE — 92083 EXTENDED VISUAL FIELD XM: CPT

## 2019-12-23 PROCEDURE — 76514 ECHO EXAM OF EYE THICKNESS: CPT

## 2020-01-07 ENCOUNTER — RX RENEWAL (OUTPATIENT)
Age: 40
End: 2020-01-07

## 2020-01-30 ENCOUNTER — RX RENEWAL (OUTPATIENT)
Age: 40
End: 2020-01-30

## 2020-02-12 ENCOUNTER — APPOINTMENT (OUTPATIENT)
Dept: ENDOCRINOLOGY | Facility: CLINIC | Age: 40
End: 2020-02-12
Payer: COMMERCIAL

## 2020-02-12 VITALS
HEIGHT: 67 IN | DIASTOLIC BLOOD PRESSURE: 78 MMHG | HEART RATE: 90 BPM | WEIGHT: 153 LBS | SYSTOLIC BLOOD PRESSURE: 118 MMHG | BODY MASS INDEX: 24.01 KG/M2

## 2020-02-12 DIAGNOSIS — E10.9 TYPE 1 DIABETES MELLITUS W/OUT COMPLICATIONS: ICD-10-CM

## 2020-02-12 LAB
GLUCOSE BLDC GLUCOMTR-MCNC: 229
HBA1C MFR BLD HPLC: 8.8

## 2020-02-12 PROCEDURE — 83036 HEMOGLOBIN GLYCOSYLATED A1C: CPT | Mod: QW

## 2020-02-12 PROCEDURE — 99214 OFFICE O/P EST MOD 30 MIN: CPT | Mod: 25

## 2020-02-12 PROCEDURE — 82962 GLUCOSE BLOOD TEST: CPT

## 2020-02-12 NOTE — PHYSICAL EXAM
[Alert] : alert [No Acute Distress] : no acute distress [Well Nourished] : well nourished [Normal Sclera/Conjunctiva] : normal sclera/conjunctiva [Normal Hearing] : hearing was normal [Well Developed] : well developed [Normal Oropharynx] : the oropharynx was normal [Supple] : the neck was supple [No Respiratory Distress] : no respiratory distress [Thyroid Not Enlarged] : the thyroid was not enlarged [Normal Rate and Effort] : normal respiratory rhythm and effort [No Accessory Muscle Use] : no accessory muscle use [Clear to Auscultation] : lungs were clear to auscultation bilaterally [Normal Rate] : heart rate was normal  [Normal S1, S2] : normal S1 and S2 [No Edema] : there was no peripheral edema [Soft] : abdomen soft [Not Tender] : non-tender [Post Cervical Nodes] : posterior cervical nodes [Not Distended] : not distended [Anterior Cervical Nodes] : anterior cervical nodes [No Stigmata of Cushings Syndrome] : no stigmata of cushings syndrome [No Rash] : no rash [Normal Gait] : normal gait [Right Foot Was Examined] : right foot ~C was examined [Left Foot Was Examined] : left foot ~C was examined [Normal] : normal [Full ROM] : with full range of motion [2+] : 2+ in the dorsalis pedis [No Tremors] : no tremors [Oriented x3] : oriented to person, place, and time [Normal Insight/Judgement] : insight and judgment were intact [Normal Affect] : the affect was normal [Normal Mood] : the mood was normal [Diminished Throughout Both Feet] : normal tactile sensation with monofilament testing throughout both feet

## 2020-02-12 NOTE — HISTORY OF PRESENT ILLNESS
[FreeTextEntry1] : 39 F with history of DM1 diagnosed in August 2014, multinodular reticulocytosis with serositis/pericarditis presenting for DM1 follow up. Not on prednisone since 2016. She reports taking no medications for her rheumatological condition currently. \par \par HbA1c increased today to 8.8% (was last 7.8% Nov 2019). Patient states she is having late night snack of 2 double stuffed oreos and not taking Humalog for that. Alternative bedtime snacks might be cereal.\par \par DM1 regimen: Humalog 12/12/12 before meals, Lantus 18 units sq qhs. Declined pump in past. \par She is not currently using Dexcom due to insurance issue. So currently using glucometer and alternates with free style julissa. \par Checks FS 4 times a day.\par -310\par Afternoon \par Bedtime 98\par No hypoglycemia recently\par Goes to gym 3 times a week. Previously she was getting hypoglycemia with work out but now she snacks before work out and hydrates adequately during work out ; so not having hypoglycemic episodes. \par \par Follows with opthalmology yearly, did have elevated eye pressures,  no diabetic complications. No neuropathy reported. No known nephropathy. \par \par B12 deficiency was noted - she continues on supplement.

## 2020-02-12 NOTE — REVIEW OF SYSTEMS
[Negative] : Heme/Lymph [All other systems negative] : All other systems negative [Fatigue] : no fatigue [Recent Weight Gain (___ Lbs)] : no recent weight gain [Recent Weight Loss (___ Lbs)] : no recent weight loss [Chest Pain] : no chest pain [Blurry Vision] : no blurred vision [Palpitations] : no palpitations [Lower Ext Edema] : no lower extremity edema [Shortness Of Breath] : no shortness of breath [Cough] : no cough [Nausea] : no nausea [Vomiting] : no vomiting was observed [Constipation] : no constipation [Diarrhea] : no diarrhea [Abdominal Pain] : no abdominal pain [Polyuria] : no polyuria [Irregular Menses] : regular menses [Pain/Numbness of Digits] : no pain/numbness of digits [Polydipsia] : no polydipsia

## 2020-02-12 NOTE — ASSESSMENT
[FreeTextEntry1] : 40 yo with history of DM1 diagnosed in August 2014 (A1c 8.8% today), multinodular reticulocytosis with serositis/pericarditis presenting for DM1 follow up\par \par 1) DM1\par Worsened DM control; HbA1c noted to be higher 8.8%, counseled about dietary changes and encouraged to continued with exercise routine. Also explained she will need to take Humalog for bedtime snack if eating carbs. Recommend Humalog 4 units pre-bedtime snack. Continue Humalog 12/12/12 premeal.\par Increase Lantus to 20 units qhs\par Off Dexcom due to unpaid bill can't obtain transmitter. Resume when able. Continue to use glucometer or free style julissa to monitor FS 3-4 times daily. \par Offerred insulin pump but patient declines at this time.\par Up to date with opthalmology follow up\par Normal foot exam in clinic today\par Microalb neg Jan 2019, repeat today\par LDL 81 Jan 2019, repeat today\par Refill glucagon emergency kit and ketostix.\par 2) B12 deficiency - B12 was 194 last visit - continue supplementation. Recheck B12 level today\par 3) History of Vit D deficiency - check 25OHD\par \par Follow up visit in 3 months. \par  [Carbohydrate Consistent Diet] : carbohydrate consistent diet

## 2020-02-14 LAB
25(OH)D3 SERPL-MCNC: 29 NG/ML
ALBUMIN SERPL ELPH-MCNC: 3.9 G/DL
ALP BLD-CCNC: 55 U/L
ALT SERPL-CCNC: 14 U/L
ANION GAP SERPL CALC-SCNC: 12 MMOL/L
AST SERPL-CCNC: 19 U/L
BILIRUB SERPL-MCNC: 0.2 MG/DL
BUN SERPL-MCNC: 8 MG/DL
CALCIUM SERPL-MCNC: 8.7 MG/DL
CHLORIDE SERPL-SCNC: 101 MMOL/L
CHOLEST SERPL-MCNC: 118 MG/DL
CHOLEST/HDLC SERPL: 2.6 RATIO
CO2 SERPL-SCNC: 23 MMOL/L
CREAT SERPL-MCNC: 0.56 MG/DL
CREAT SPEC-SCNC: 99 MG/DL
GLUCOSE SERPL-MCNC: 225 MG/DL
HDLC SERPL-MCNC: 46 MG/DL
LDLC SERPL CALC-MCNC: 62 MG/DL
MICROALBUMIN 24H UR DL<=1MG/L-MCNC: <1.2 MG/DL
MICROALBUMIN/CREAT 24H UR-RTO: NORMAL MG/G
POTASSIUM SERPL-SCNC: 4.4 MMOL/L
PROT SERPL-MCNC: 6.4 G/DL
SODIUM SERPL-SCNC: 136 MMOL/L
T4 FREE SERPL-MCNC: 1.2 NG/DL
TRIGL SERPL-MCNC: 52 MG/DL
TSH SERPL-ACNC: 1.89 UIU/ML
TTG IGA SER IA-ACNC: <1.2 U/ML
TTG IGA SER-ACNC: NEGATIVE
TTG IGG SER IA-ACNC: 8.2 U/ML
TTG IGG SER IA-ACNC: ABNORMAL
VIT B12 SERPL-MCNC: 602 PG/ML

## 2020-05-20 ENCOUNTER — APPOINTMENT (OUTPATIENT)
Dept: ENDOCRINOLOGY | Facility: CLINIC | Age: 40
End: 2020-05-20
Payer: COMMERCIAL

## 2020-05-20 PROCEDURE — 99214 OFFICE O/P EST MOD 30 MIN: CPT | Mod: 95

## 2020-05-20 PROCEDURE — 95251 CONT GLUC MNTR ANALYSIS I&R: CPT

## 2020-06-11 ENCOUNTER — RX RENEWAL (OUTPATIENT)
Age: 40
End: 2020-06-11

## 2020-06-23 ENCOUNTER — APPOINTMENT (OUTPATIENT)
Dept: GASTROENTEROLOGY | Facility: CLINIC | Age: 40
End: 2020-06-23
Payer: COMMERCIAL

## 2020-06-23 DIAGNOSIS — K90.0 CELIAC DISEASE: ICD-10-CM

## 2020-06-23 PROCEDURE — 99443: CPT

## 2020-06-26 NOTE — ASSESSMENT
[FreeTextEntry1] : 40 year old female with markus 1 DM who has a new diagnosis of Celiac disease with minimal symptoms.\par \par Plan: EGD with duodenal Bx. Nutrition consult.

## 2020-06-26 NOTE — HISTORY OF PRESENT ILLNESS
[de-identified] : 40 year old female with Type 1 DM who was referred for evaluation of Celiac disease. Pt. had a positive TTG. Currently is having symptoms of increased flatulence for many months and one week of diarrhea. Pt denies other GI symptoms and does not have chronic diarrhea. She notes that she has increased gas when she eats pizza and pesto sauce causes stomach upset and nausea. She also has a history of RA and has been treated with Methotrexate in the past. She denies steatorrhea, and any significant weight loss. It should be noted that she had a large pericardia effusion at one time and require a pericardial window.. She denies a FH of celiac disease.

## 2020-09-09 ENCOUNTER — APPOINTMENT (OUTPATIENT)
Dept: GASTROENTEROLOGY | Facility: HOSPITAL | Age: 40
End: 2020-09-09

## 2020-09-16 ENCOUNTER — APPOINTMENT (OUTPATIENT)
Dept: ENDOCRINOLOGY | Facility: CLINIC | Age: 40
End: 2020-09-16
Payer: COMMERCIAL

## 2020-09-16 PROCEDURE — 95251 CONT GLUC MNTR ANALYSIS I&R: CPT

## 2020-09-16 PROCEDURE — 99214 OFFICE O/P EST MOD 30 MIN: CPT | Mod: 95

## 2020-12-02 ENCOUNTER — APPOINTMENT (OUTPATIENT)
Dept: ENDOCRINOLOGY | Facility: CLINIC | Age: 40
End: 2020-12-02
Payer: COMMERCIAL

## 2020-12-02 PROCEDURE — 99214 OFFICE O/P EST MOD 30 MIN: CPT | Mod: 95

## 2021-08-11 ENCOUNTER — APPOINTMENT (OUTPATIENT)
Dept: ENDOCRINOLOGY | Facility: CLINIC | Age: 41
End: 2021-08-11
Payer: COMMERCIAL

## 2021-08-11 DIAGNOSIS — E10.65 TYPE 1 DIABETES MELLITUS WITH HYPERGLYCEMIA: ICD-10-CM

## 2021-08-11 PROCEDURE — 99214 OFFICE O/P EST MOD 30 MIN: CPT | Mod: 95

## 2021-08-11 PROCEDURE — 95251 CONT GLUC MNTR ANALYSIS I&R: CPT

## 2021-08-11 RX ORDER — GLUCAGON 3 MG/1
3 POWDER NASAL
Qty: 3 | Refills: 3 | Status: ACTIVE | COMMUNITY
Start: 2021-08-11 | End: 1900-01-01

## 2021-08-11 NOTE — REASON FOR VISIT
[Follow - Up] : a follow-up visit [DM Type 1] : DM Type 1 [Home] : at home, [unfilled] , at the time of the visit. [Medical Office: ([facility])___] : at the medical office located in  [Verbal consent obtained from patient] : the patient, [unfilled]

## 2021-08-12 NOTE — REVIEW OF SYSTEMS
[Negative] : Heme/Lymph [Recent Weight Gain (___ Lbs)] : no recent weight gain [Recent Weight Loss (___ Lbs)] : no recent weight loss [Blurred Vision] : no blurred vision [Chest Pain] : no chest pain

## 2021-08-12 NOTE — PHYSICAL EXAM
[Alert] : alert [Well Nourished] : well nourished [Healthy Appearance] : healthy appearance [No Acute Distress] : no acute distress [Well Developed] : well developed [EOMI] : extra ocular movement intact [No Proptosis] : no proptosis [Normal Outer Ear/Nose] : the ears and nose were normal in appearance [Normal Hearing] : hearing was normal [Supple] : the neck was supple [No Respiratory Distress] : no respiratory distress [No Accessory Muscle Use] : no accessory muscle use [No Edema] : no peripheral edema [Not Distended] : not distended [No Stigmata of Cushings Syndrome] : no stigmata of Cushings Syndrome [No Skin Lesions] : no skin lesions [Cranial Nerves Intact] : cranial nerves 2-12 were intact [No Tremors] : no tremors [Oriented x3] : oriented to person, place, and time [Normal Affect] : the affect was normal [Normal Mood] : the mood was normal [Acanthosis Nigricans] : no acanthosis nigricans [Foot Ulcers] : no foot ulcers [Hirsutism] : no hirsutism [de-identified] : no visible goiter

## 2021-08-12 NOTE — ASSESSMENT
[FreeTextEntry1] : 41 yo with history of DM1 diagnosed in August 2014 (A1c 8.8% in Feb 2020)multinodular reticulocytosis with serositis/pericarditis presenting for DM1 telehealth follow up. Recently moved to [place].\par \par 1) DM1\par HbA1c in Sept 2020 6.2% at goal, no recent HbA1c, she is planning to establish care with new PCP next month and will check labs including HBA1c and forward results.\par Humalog 12/12/12 premeal. and use 6-8 units before snack with carbs --> based on Todd review would adjust Humalog to 13/11/12 given highs post breakfast and lows post lunch.\par Continue Lantus 18 units qhs\par Off Dexcom due to unpaid bill can't obtain transmitter. Continue free style todd to monitor FS 4 times daily. \par Offerred insulin pump but patient declines\par Up to date with opthalmology follow up\par Normal foot exam last in person visit. Recommend she have PCP in [place] for physical exam if she will be doing endocrine visit via telehealth. Also she will be travelling to [place] intermittently for work so will try to schedule in person visit when possible.\par BP should be checked with PCP. No history of HTN.\par Microalb neg Feb 2020\par LDL 62  Feb 2020\par Send Baqsimi,  ketostix.\par Weakly positive celiac screen - GI consult recommending EGD, not done yet. Recommend see GI in [place]\par 2) B12 deficiency - B12 was 194 last year then improved with supplementation to 602- continue supplementation.\par 3) History of Vit D deficiency - 25OHD 29.0 - close to normal.\par \par Follow up visit in 3 months (TEB unless visiting [place])\par

## 2021-08-12 NOTE — HISTORY OF PRESENT ILLNESS
[FreeTextEntry1] : 41 F with history of DM1 diagnosed in August 2014, multinodular reticulocytosis with serositis/pericarditis presenting for telehealth DM1 follow up today. Not on prednisone since 2016. She reports taking no medications for her rheumatological condition currently. \par She has moved to [place] to live near her mother. She is doing well there. Still will be coming back to [place] periodically for work.\par Has PCP visit next month will be having blood and HbA1c then and will forward results.\par PCP - Dr. Garcia\par Overal she reports DM has been doing well. \par HbA1c was improved at 6.2% in September 2020\par DM1 regimen: Humalog 12/12/12 before meals (and takes 6-8 units before snack), Lantus 18 units sq qhs. Declined pump in past. \par She is not currently using Dexcom due to insurance issue but has been using Todd.\par Checks FS 4 times a day.\par This \par Highest last week 200s after eating more\par One hypoglycemia 56 last week in afternoon around 2 PM due to late lunch.\par \par Todd download reviewed. See full report scanned into [email or web address].\par GMI 6.8%\par avg glu 144\par glu angel 41.5%\par 4% very high\par 26% high\par 60% TIR\par 8% low\par 2% very low\par pattern of elevation post breakfast and hypoglycemia mid afternoon or predinner.\par \par Had one hypoglycemia event 65 after not eating as much as she had planned but otherwise FS in goal range.\par Exercising at home, diet is fair.\par Follows with opthalmology yearly, did have elevated eye pressures,  no diabetic complications. Due for next optho visit will find new ophtho in [place]. No neuropathy reported. No known nephropathy, last microalb neg (2/20).\par B12 deficiency was noted - she continues on supplement.\par Had been noted with weakly positive TTG IgG - GI consult done with Dr. Dawson to rule out celiac disease. Recommended EGD which has not done yet due to moving to [place].

## 2021-11-24 ENCOUNTER — APPOINTMENT (OUTPATIENT)
Dept: ENDOCRINOLOGY | Facility: CLINIC | Age: 41
End: 2021-11-24
Payer: COMMERCIAL

## 2021-11-24 DIAGNOSIS — E53.8 DEFICIENCY OF OTHER SPECIFIED B GROUP VITAMINS: ICD-10-CM

## 2021-11-24 DIAGNOSIS — E10.9 TYPE 1 DIABETES MELLITUS W/OUT COMPLICATIONS: ICD-10-CM

## 2021-11-24 DIAGNOSIS — E55.9 VITAMIN D DEFICIENCY, UNSPECIFIED: ICD-10-CM

## 2021-11-24 PROCEDURE — 95251 CONT GLUC MNTR ANALYSIS I&R: CPT

## 2021-11-24 PROCEDURE — 99214 OFFICE O/P EST MOD 30 MIN: CPT | Mod: 95

## 2021-11-24 RX ORDER — INSULIN GLARGINE 100 [IU]/ML
100 INJECTION, SOLUTION SUBCUTANEOUS
Qty: 2 | Refills: 3 | Status: ACTIVE | COMMUNITY
Start: 2018-01-17 | End: 1900-01-01

## 2021-11-24 RX ORDER — FLASH GLUCOSE SENSOR
KIT MISCELLANEOUS
Qty: 6 | Refills: 3 | Status: ACTIVE | COMMUNITY
Start: 2018-01-17 | End: 1900-01-01

## 2021-11-24 NOTE — REASON FOR VISIT
[Follow - Up] : a follow-up visit [DM Type 1] : DM Type 1 [Home] : at home, [unfilled] , at the time of the visit. [Medical Office: ([facility])___] : at the medical office located in  [Verbal consent obtained from patient] : the patient, [unfilled]

## 2021-11-29 NOTE — ASSESSMENT
[FreeTextEntry1] : 40 yo with history of DM1 diagnosed in August 2014 (A1c 8.8% in Feb 2020)multinodular reticulocytosis with serositis/pericarditis presenting for DM1 telehealth follow up. Recently moved to [place].\par \par 1) DM1\par HbA1c in Sept 2020 6.2% at goal, no recent HbA1c available today but she had labs drawn yesterday will obtain results.\par Humalog 13/10/12 premeal. and use 6-8 units before snack with carbs\par Continue Lantus 18 units qhs\par Off Dexcom due to unpaid bill can't obtain transmitter. Continue free style todd to monitor FS 4 times daily. \par Change to Todd 2.\par Offerred insulin pump but patient declines\par Up to date with opthalmology follow up\par Foot exam by PCP just had physical\par BP should be checked with PCP. No history of HTN.\par Microalb neg Feb 2020\par LDL 62  Feb 2020\par Send Baqsimi,  ketostix.\par Weakly positive celiac screen - GI consult recommending EGD, not done yet. Recommend see GI in [place]\par Will be following up with cardiology for abnormal EKG.\par 2) B12 deficiency - B12 was 194 last year then improved with supplementation to 602- continue supplementation.\par 3) History of Vit D deficiency - 25OHD 29.0 - close to normal. Continue D3 supplement.\par \par Follow up visit in 3 months (TEB unless visiting [place])\par

## 2021-11-29 NOTE — PHYSICAL EXAM
[Alert] : alert [Well Nourished] : well nourished [Healthy Appearance] : healthy appearance [No Acute Distress] : no acute distress [Well Developed] : well developed [EOMI] : extra ocular movement intact [No Proptosis] : no proptosis [Normal Outer Ear/Nose] : the ears and nose were normal in appearance [Normal Hearing] : hearing was normal [Supple] : the neck was supple [No Respiratory Distress] : no respiratory distress [No Accessory Muscle Use] : no accessory muscle use [No Edema] : no peripheral edema [Not Distended] : not distended [No Stigmata of Cushings Syndrome] : no stigmata of Cushings Syndrome [No Skin Lesions] : no skin lesions [Cranial Nerves Intact] : cranial nerves 2-12 were intact [No Tremors] : no tremors [Oriented x3] : oriented to person, place, and time [Normal Affect] : the affect was normal [Normal Mood] : the mood was normal [Acanthosis Nigricans] : no acanthosis nigricans [Foot Ulcers] : no foot ulcers [Hirsutism] : no hirsutism [de-identified] : no visible goiter

## 2021-11-29 NOTE — HISTORY OF PRESENT ILLNESS
[FreeTextEntry1] : 41 F with history of DM1 diagnosed in August 2014, multinodular reticulocytosis with serositis/pericarditis presenting for telehealth DM1 follow up today. Not on prednisone since 2016. She reports taking no medications for her rheumatological condition currently. \par She has moved to [place] to live near her mother. She is doing well there. \par Had physical with new PCP this week, had blood drawn yesterday await results.\par She was told she had a t wave inversion on EKG and will be seeing a cardiologist for eval.\par She was given a referral to see GI.\par Overal she reports DM has been doing well with a few highs and lows but otherwise is ok.\par Using Todd 14 day but is interested in trying Todd 2. Has an iphone to use as reader. \par HbA1c was  6.2% in September 2020\par DM1 regimen: Humalog 13/10/12 before meals (and takes 6-8 units before snack), Lantus 18 units sq qhs. Declined pump in past. \par She is not currently using Dexcom due to insurance issue but has been using Todd.\par Checks FS 4 times a day.\par \par Todd download reviewed:\par GMI 6.5%\par very high 2%\par high 23%\par TIR 58%\par low 7%\par very low 10%\par \par \par Exercising at home, diet is fair.\par Follows with opthalmology yearly, did have elevated eye pressures,  no diabetic complications. Due for next optho visit will find new ophtho in [place]. No neuropathy reported. No known nephropathy, last microalb neg (2/20).\par B12 deficiency was noted - she continues on supplement.\par Had been noted with weakly positive TTG IgG - GI consult done with Dr. Dawson to rule out celiac disease. Recommended EGD which has not done yet due to moving to [place]. Now has GI referral from new PCP in [place]

## 2022-03-09 ENCOUNTER — APPOINTMENT (OUTPATIENT)
Dept: ENDOCRINOLOGY | Facility: CLINIC | Age: 42
End: 2022-03-09

## 2022-07-19 PROBLEM — H40.003 GLAUCOMA SUSPECT OF BOTH EYES: Status: ACTIVE | Noted: 2019-03-13

## 2025-08-22 ENCOUNTER — APPOINTMENT (OUTPATIENT)
Dept: URBAN - METROPOLITAN AREA CLINIC 45 | Facility: CLINIC | Age: 45
Setting detail: DERMATOLOGY
End: 2025-08-22

## 2025-08-22 DIAGNOSIS — L66.81 CENTRAL CENTRIFUGAL CICATRICIAL ALOPECIA: ICD-10-CM | Status: INADEQUATELY CONTROLLED

## 2025-08-22 PROCEDURE — ? PRESCRIPTION

## 2025-08-22 PROCEDURE — ? FULL BODY SKIN EXAM - DECLINED

## 2025-08-22 PROCEDURE — ? COUNSELING

## 2025-08-22 PROCEDURE — ? MEDICATION COUNSELING

## 2025-08-22 RX ORDER — BETAMETHASONE DIPROPIONATE 0.5 MG/ML
LOTION TOPICAL
Qty: 60 | Refills: 3 | Status: ERX | COMMUNITY
Start: 2025-08-22

## 2025-08-22 RX ORDER — KETOCONAZOLE 20 MG/ML
SHAMPOO, SUSPENSION TOPICAL
Qty: 120 | Refills: 1 | Status: ERX | COMMUNITY
Start: 2025-08-22

## 2025-08-22 RX ORDER — SPIRONOLACTONE 100 MG/1
TABLET, FILM COATED ORAL
Qty: 30 | Refills: 1 | Status: ERX | COMMUNITY
Start: 2025-08-22

## 2025-08-22 RX ORDER — DOXYCYCLINE HYCLATE 100 MG/1
CAPSULE, GELATIN COATED ORAL QD
Qty: 30 | Refills: 0 | Status: ERX | COMMUNITY
Start: 2025-08-22

## 2025-08-22 RX ORDER — MINOXIDIL/FINASTERIDE 7 %-0.1 %
SOLUTION, NON-ORAL TOPICAL
Qty: 60 | Refills: 2 | Status: ACTIVE

## 2025-08-22 RX ADMIN — SPIRONOLACTONE: 100 TABLET, FILM COATED ORAL at 00:00

## 2025-08-22 RX ADMIN — BETAMETHASONE DIPROPIONATE: 0.5 LOTION TOPICAL at 00:00

## 2025-08-22 RX ADMIN — DOXYCYCLINE HYCLATE: 100 CAPSULE, GELATIN COATED ORAL at 00:00

## 2025-08-22 RX ADMIN — KETOCONAZOLE: 20 SHAMPOO, SUSPENSION TOPICAL at 00:00

## 2025-08-22 ASSESSMENT — LOCATION ZONE DERM: LOCATION ZONE: SCALP

## 2025-08-22 ASSESSMENT — LOCATION SIMPLE DESCRIPTION DERM: LOCATION SIMPLE: ANTERIOR SCALP

## 2025-08-22 ASSESSMENT — LOCATION DETAILED DESCRIPTION DERM: LOCATION DETAILED: MID-FRONTAL SCALP
